# Patient Record
(demographics unavailable — no encounter records)

---

## 2019-10-04 NOTE — NUR
ED Nurse Note:

Pt ambulated to ED from home c/o vaginal skin burning when she urinates, 
reports "it feels different than a UTI". Pt presents with pain and multiple 
bruises after falling down 10 stairs on monday, pt reports hitting head and 
LOC. Pt is A&Ox4. VSS

## 2019-10-04 NOTE — DIAGNOSTIC IMAGING REPORT
EXAM:

  CT Head Without Intravenous Contrast

 

CLINICAL HISTORY:

  AMS

 

TECHNIQUE:

  Axial computed tomography images of the head brain without intravenous 

contrast.  CTDI is 60 mGy and DLP is 1316.2 mGy-cm.  One or more of the 

following dose reduction techniques were used: automated exposure control,

 adjustment of the mA and or kV according to patient size, use of 

iterative reconstruction technique.

 

COMPARISON:

  No relevant prior studies available.

 

FINDINGS:

  Brain:  Areas of mild decreased density in the white matter which are 

nonspecific but are likely related to mild small vessel ischemic changes. 

 No hemorrhage.

  Ventricles:  Unremarkable.

  Bones joints:  Unremarkable.  No acute fracture.

  Soft tissues:  Unremarkable.

  Sinuses:  Unremarkable as visualized.  No acute sinusitis.

  Mastoid air cells:  Unremarkable as visualized.  No mastoid effusion.

 

IMPRESSION:     

  Areas of mild decreased density in the white matter which are 

nonspecific but are likely related to mild small vessel ischemic changes.

## 2019-10-04 NOTE — EMERGENCY ROOM REPORT
History of Present Illness


General


Chief Complaint:  General Complaint


Source:  Patient





Present Illness


HPI


Patient is a 54-year-old female presents after increased generalized pain and 

chest discomfort after recent fall.  Patient reports having fallen down 

multiple stairs several days prior to arrival.  She reports having increased 

pain to her low back as well as to her chest.  She reports having increased 

painful urination for the past few days.  She denies any fever.  She reports 

taking Coumadin due to lupus anticoagulant.  She also noted some increased 

swelling to both legs.


Allergies:  


Coded Allergies:  


     PENICILLINS (Verified  Allergy, Severe, Shortness of Breath, 12/28/17)





Patient History


Past Medical History:  see triage record


Last Menstrual Period:  08/28/19


Pregnant Now:  No


Reviewed Nursing Documentation:  PMH: Agreed; PSxH: Agreed





Nursing Documentation-PMH


Past Medical History:  No History, Except For


Hx Cardiac Problems:  No


Hx Asthma:  Yes


Hx Neurological Problems:  Yes - SLE





Review of Systems


All Other Systems:  negative except mentioned in HPI





Physical Exam





Vital Signs








  Date Time  Temp Pulse Resp B/P (MAP) Pulse Ox O2 Delivery O2 Flow Rate FiO2


 


10/4/19 18:52 97.7 100 20 107/62 (77) 97 Room Air  


 


10/4/19 21:28        21








Sp02 EP Interpretation:  reviewed, normal


General Appearance:  normal inspection, well appearing, no apparent distress, 

alert, GCS 15


Head:  atraumatic


ENT:  normal ENT inspection, hearing grossly normal, normal voice


Neck:  normal inspection, full range of motion, supple, no bony tend


Respiratory:  normal inspection, lungs clear, normal breath sounds, no 

respiratory distress, no retraction, no wheezing


Cardiovascular #1:  regular rate, rhythm, no edema


Gastrointestinal:  normal inspection, normal bowel sounds, non tender, soft, no 

guarding, no hernia


Genitourinary:  no CVA tenderness


Musculoskeletal:  normal inspection, back normal, normal range of motion


Neurologic:  normal inspection, alert, oriented x3, responsive, CNs III-XII nml 

as tested, speech normal


Psychiatric:  normal inspection, judgement/insight normal, mood/affect normal





Medical Decision Making


Diagnostic Impression:  


 Primary Impression:  


 Fall


 Additional Impressions:  


 Lupus


 Contusion of multiple sites of lower extremity


ER Course


Patient is a 54-year-old female presented after increased pain after fall.  

Differential diagnosis include was not limited to pulmonary embolism, hemorrhage

, fracture among others.  Because of complexity of patient's case laboratory 

tests and imaging studies were ordered.  Patient was noted to have adequate 

laboratory testing without evidence of coagulopathy.  CT imaging of the head 

showed no evidence of acute intracranial pathology.  See radiology report for 

full details.  Patient is unlikely to be taking Coumadin at this time.  CT 

angiogram of the chest showed no evidence of acute fracture or pulmonary 

embolism.  See radiology report for full details.  Patient was given pain 

medications emergency department.  She is noted to have some improvement.  

There is did not appear to be any evidence of pulmonary embolism.  Patient was 

noted to be ambulatory with a steady gait.  Patient given steroids as well as 

pain medication to what appears to be a flare of her lupus.  She appears to be 

stable for outpatient follow-up with her primary care physician.  Patient was 

given prescription for pain medications.  She advised to continue taking her 

medications as previously prescribed.





Labs








Test


  10/4/19


19:45 10/5/19


00:02


 


White Blood Count


  7.1 K/UL


(4.8-10.8) 


 


 


Red Blood Count


  4.48 M/UL


(4.20-5.40) 


 


 


Hemoglobin


  12.1 G/DL


(12.0-16.0) 


 


 


Hematocrit


  38.5 %


(37.0-47.0) 


 


 


Mean Corpuscular Volume 86 FL (80-99)  


 


Mean Corpuscular Hemoglobin


  26.9 PG


(27.0-31.0) 


 


 


Mean Corpuscular Hemoglobin


Concent 31.4 G/DL


(32.0-36.0) 


 


 


Red Cell Distribution Width


  15.2 %


(11.6-14.8) 


 


 


Platelet Count


  300 K/UL


(150-450) 


 


 


Mean Platelet Volume


  7.4 FL


(6.5-10.1) 


 


 


Neutrophils (%) (Auto)


  66.0 %


(45.0-75.0) 


 


 


Lymphocytes (%) (Auto)


  23.2 %


(20.0-45.0) 


 


 


Monocytes (%) (Auto)


  5.8 %


(1.0-10.0) 


 


 


Eosinophils (%) (Auto)


  3.7 %


(0.0-3.0) 


 


 


Basophils (%) (Auto)


  1.2 %


(0.0-2.0) 


 


 


Prothrombin Time


  10.1 SEC


(9.30-11.50) 


 


 


Prothromb Time International


Ratio 0.9 (0.9-1.1) 


  


 


 


Activated Partial


Thromboplast Time 28 SEC (23-33) 


  


 


 


Sodium Level


  141 MMOL/L


(136-145) 


 


 


Potassium Level


  3.3 MMOL/L


(3.5-5.1) 


 


 


Chloride Level


  107 MMOL/L


() 


 


 


Carbon Dioxide Level


  27 MMOL/L


(21-32) 


 


 


Anion Gap


  7 mmol/L


(5-15) 


 


 


Blood Urea Nitrogen


  14 mg/dL


(7-18) 


 


 


Creatinine


  1.1 MG/DL


(0.55-1.30) 


 


 


Estimat Glomerular Filtration


Rate > 60 mL/min


(>60) 


 


 


Glucose Level


  113 MG/DL


() 


 


 


Calcium Level


  10.6 MG/DL


(8.5-10.1) 


 


 


Total Bilirubin


  0.3 MG/DL


(0.2-1.0) 


 


 


Aspartate Amino Transf


(AST/SGOT) 14 U/L (15-37) 


  


 


 


Alanine Aminotransferase


(ALT/SGPT) 29 U/L (12-78) 


  


 


 


Alkaline Phosphatase


  90 U/L


() 


 


 


Pro-B-Type Natriuretic Peptide


  29 pg/mL


(0-125) 


 


 


Total Protein


  7.8 G/DL


(6.4-8.2) 


 


 


Albumin


  3.2 G/DL


(3.4-5.0) 


 


 


Globulin 4.6 g/dL  


 


Albumin/Globulin Ratio 0.7 (1.0-2.7)  


 


Thyroid Stimulating Hormone


(TSH) 0.941 uiU/mL


(0.358-3.740) 


 


 


Urine Color  Pale yellow 


 


Urine Appearance  Clear 


 


Urine pH  7 (4.5-8.0) 


 


Urine Specific Gravity


  


  1.010


(1.005-1.035)


 


Urine Protein  1+ (NEGATIVE) 


 


Urine Glucose (UA)


  


  Negative


(NEGATIVE)


 


Urine Ketones


  


  Negative


(NEGATIVE)


 


Urine Blood


  


  Negative


(NEGATIVE)


 


Urine Nitrite


  


  Negative


(NEGATIVE)


 


Urine Bilirubin


  


  Negative


(NEGATIVE)


 


Urine Urobilinogen


  


  Normal MG/DL


(0.0-1.0)


 


Urine Leukocyte Esterase


  


  Negative


(NEGATIVE)


 


Urine RBC


  


  0-2 /HPF (0 -


2)


 


Urine WBC


  


  0-2 /HPF (0 -


2)


 


Urine Squamous Epithelial


Cells 


  Few /LPF


(NONE/OCC)


 


Urine Bacteria


  


  Occasional


/HPF (NONE)











Last Vital Signs








  Date Time  Temp Pulse Resp B/P (MAP) Pulse Ox O2 Delivery O2 Flow Rate FiO2


 


10/4/19 21:34        21


 


10/4/19 21:28  87 18  97 Room Air  


 


10/4/19 19:17 97.7   107/62    








Status:  improved


Disposition:  HOME, SELF-CARE


Condition:  Stable


Scripts


Hydrocodone Bit/Acetaminophen 5-325* (NORCO 5-325*) 1 Each Tablet


1 TAB ORAL Q4H PRN for For Pain, #12 TAB 0 Refills


   Prov: Doc Cazares MD         10/4/19 


Prednisone* (PREDNISONE*) 20 Mg Tablet


40 MG ORAL DAILY, #10 TAB


   Prov: Doc Cazares MD         10/4/19


Referrals:  


PREFERRED [facility],REFERRING (PCP)











Doc Cazares MD Oct 4, 2019 21:43

## 2019-10-05 NOTE — DIAGNOSTIC IMAGING REPORT
EXAM:

  CT Angiography Chest With Intravenous Contrast

 

CLINICAL HISTORY:

  CP

 

TECHNIQUE:

  Axial computed tomographic angiography images of the chest with 

intravenous contrast.  CTDI is 21.2 mGy and DLP is 771.1 mGy-cm.  One or 

more of the following dose reduction techniques were used: automated 

exposure control, adjustment of the mA and or kV according to patient 

size, use of iterative reconstruction technique.

  MIP reconstructed images were created and reviewed.

  Coronal and sagittal reformatted images were created and reviewed.

 

COMPARISON:

  12 28 17

 

FINDINGS:

  Pulmonary arteries:  No evidence for pulmonary embolism.

  Aorta:  The aorta is unremarkable.  No thoracic aortic aneurysm.

  Lungs:  Mild atelectasis at the dependent aspects of the lungs.  No 

mass.

  Pleural space:  Unremarkable.  No significant effusion.  No 

pneumothorax.

  Heart:  Cardiomegaly.  No significant pericardial effusion.  No 

evidence of RV dysfunction.

  Bones joints:  Mild degenerative changes of the thoracic spine.  No 

acute fracture.  No dislocation.

  Soft tissues:  Unremarkable.

  Lymph nodes:  Unremarkable.  No enlarged lymph nodes.

 

IMPRESSION:     

1.  No evidence for pulmonary embolism.

2.  Cardiomegaly.